# Patient Record
Sex: FEMALE | Race: ASIAN | NOT HISPANIC OR LATINO | Employment: STUDENT | URBAN - METROPOLITAN AREA
[De-identification: names, ages, dates, MRNs, and addresses within clinical notes are randomized per-mention and may not be internally consistent; named-entity substitution may affect disease eponyms.]

---

## 2017-10-06 PROCEDURE — G0145 SCR C/V CYTO,THINLAYER,RESCR: HCPCS | Performed by: INTERNAL MEDICINE

## 2017-10-08 ENCOUNTER — LAB REQUISITION (OUTPATIENT)
Dept: LAB | Facility: HOSPITAL | Age: 22
End: 2017-10-08

## 2017-10-08 DIAGNOSIS — Z01.419 ENCOUNTER FOR GYNECOLOGICAL EXAMINATION WITHOUT ABNORMAL FINDING: ICD-10-CM

## 2017-10-22 LAB
LAB AP GYN PRIMARY INTERPRETATION: NORMAL
LAB AP LMP: NORMAL
Lab: NORMAL

## 2018-11-19 ENCOUNTER — EMERGENCY (EMERGENCY)
Facility: HOSPITAL | Age: 23
LOS: 1 days | Discharge: HOME | End: 2018-11-19
Attending: EMERGENCY MEDICINE
Payer: COMMERCIAL

## 2018-11-19 VITALS
SYSTOLIC BLOOD PRESSURE: 136 MMHG | HEIGHT: 61 IN | WEIGHT: 119.05 LBS | RESPIRATION RATE: 16 BRPM | TEMPERATURE: 98 F | HEART RATE: 74 BPM | DIASTOLIC BLOOD PRESSURE: 88 MMHG | OXYGEN SATURATION: 100 %

## 2018-11-19 PROCEDURE — 96372 THER/PROPH/DIAG INJ SC/IM: CPT

## 2018-11-19 PROCEDURE — 87491 CHLMYD TRACH DNA AMP PROBE: CPT

## 2018-11-19 PROCEDURE — 99284 EMERGENCY DEPT VISIT MOD MDM: CPT | Mod: 25

## 2018-11-19 PROCEDURE — 99284 EMERGENCY DEPT VISIT MOD MDM: CPT

## 2018-11-19 PROCEDURE — 87591 N.GONORRHOEAE DNA AMP PROB: CPT

## 2018-11-19 RX ORDER — METRONIDAZOLE 500 MG
2000 TABLET ORAL ONCE
Qty: 0 | Refills: 0 | Status: COMPLETED | OUTPATIENT
Start: 2018-11-19 | End: 2018-11-19

## 2018-11-19 RX ORDER — LEVONORGESTREL 1.5 MG/1
1.5 TABLET ORAL ONCE
Qty: 0 | Refills: 0 | Status: COMPLETED | OUTPATIENT
Start: 2018-11-19 | End: 2018-11-19

## 2018-11-19 RX ORDER — AZITHROMYCIN 500 MG/1
1000 TABLET, FILM COATED ORAL ONCE
Qty: 0 | Refills: 0 | Status: COMPLETED | OUTPATIENT
Start: 2018-11-19 | End: 2018-11-19

## 2018-11-19 RX ORDER — FLUCONAZOLE 150 MG/1
150 TABLET ORAL ONCE
Qty: 0 | Refills: 0 | Status: COMPLETED | OUTPATIENT
Start: 2018-11-19 | End: 2018-11-19

## 2018-11-19 RX ORDER — CEFTRIAXONE 500 MG/1
250 INJECTION, POWDER, FOR SOLUTION INTRAMUSCULAR; INTRAVENOUS ONCE
Qty: 0 | Refills: 0 | Status: COMPLETED | OUTPATIENT
Start: 2018-11-19 | End: 2018-11-19

## 2018-11-19 RX ADMIN — FLUCONAZOLE 150 MILLIGRAM(S): 150 TABLET ORAL at 15:22

## 2018-11-19 RX ADMIN — LEVONORGESTREL 1.5 MILLIGRAM(S): 1.5 TABLET ORAL at 15:23

## 2018-11-19 RX ADMIN — AZITHROMYCIN 1000 MILLIGRAM(S): 500 TABLET, FILM COATED ORAL at 15:21

## 2018-11-19 RX ADMIN — Medication 2000 MILLIGRAM(S): at 15:22

## 2018-11-19 RX ADMIN — CEFTRIAXONE 250 MILLIGRAM(S): 500 INJECTION, POWDER, FOR SOLUTION INTRAMUSCULAR; INTRAVENOUS at 15:25

## 2018-11-19 NOTE — ED ADULT NURSE NOTE - OBJECTIVE STATEMENT
1242  22 y/o f to er from , as per triage. 1242  24 y/o f to er from , as per triage. 1320 pt seen and eval by shantel gomez shiny and declines shantel exam at this time.
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
## 2018-11-19 NOTE — ED PROVIDER NOTE - PHYSICAL EXAMINATION
Attending note. Patient is alert and in moderate distress. Head is normocephalic and atraumatic. Oropharynx is normal. Patient has tenderness to the paracervical muscles bilaterally with right greater than left. Patient also has some tenderness in the right trapezius. Back is nontender. There are no signs of trauma on the back or thorax. Patient has tenderness in the right anterior lateral lower rib. Abdomen is soft and nontender. There is no CVA tenderness or suprapubic tenderness. Pelvis is nontender. Examination of the upper extremities reveals tender bruising lateral aspect of the right elbow with full range of motion. Wrist and hand on the right are nontender. Patient has tenderness just distal to the left shoulder with some swelling, but without ecchymosis. Shoulders nontender. Elbow is nontender. Sensation is intact and normal. Patient has some tenderness in the right anterior thigh without signs of trauma. Patient's full range of motion of the lower extremity. Genital examination reveals no external signs of trauma. Speculum examination shows thick white discharge. There is no signs of trauma in the vagina. There is no other discharge. Patient has a minimal tenderness on vaginal exam.

## 2018-11-20 LAB
C TRACH RRNA SPEC QL NAA+PROBE: SIGNIFICANT CHANGE UP
N GONORRHOEA RRNA SPEC QL NAA+PROBE: SIGNIFICANT CHANGE UP
SPECIMEN SOURCE: SIGNIFICANT CHANGE UP